# Patient Record
Sex: FEMALE | Race: BLACK OR AFRICAN AMERICAN | NOT HISPANIC OR LATINO | Employment: UNEMPLOYED | URBAN - METROPOLITAN AREA
[De-identification: names, ages, dates, MRNs, and addresses within clinical notes are randomized per-mention and may not be internally consistent; named-entity substitution may affect disease eponyms.]

---

## 2023-08-28 ENCOUNTER — HOSPITAL ENCOUNTER (EMERGENCY)
Facility: HOSPITAL | Age: 58
Discharge: HOME | End: 2023-08-28
Attending: EMERGENCY MEDICINE

## 2023-08-28 ENCOUNTER — APPOINTMENT (EMERGENCY)
Dept: CT IMAGING | Facility: HOSPITAL | Age: 58
End: 2023-08-28

## 2023-08-28 VITALS
DIASTOLIC BLOOD PRESSURE: 86 MMHG | HEIGHT: 66 IN | HEART RATE: 66 BPM | OXYGEN SATURATION: 98 % | RESPIRATION RATE: 17 BRPM | SYSTOLIC BLOOD PRESSURE: 154 MMHG | BODY MASS INDEX: 28.93 KG/M2 | TEMPERATURE: 97.8 F | WEIGHT: 180 LBS

## 2023-08-28 DIAGNOSIS — K21.9 GERD (GASTROESOPHAGEAL REFLUX DISEASE): ICD-10-CM

## 2023-08-28 DIAGNOSIS — R07.89 ATYPICAL CHEST PAIN: Primary | ICD-10-CM

## 2023-08-28 DIAGNOSIS — R91.1 PULMONARY NODULE: ICD-10-CM

## 2023-08-28 LAB
ANION GAP SERPL CALCULATED.3IONS-SCNC: 6 MMOL/L
BASOPHILS # BLD AUTO: 0.02 THOUSANDS/ÂΜL (ref 0–0.1)
BASOPHILS NFR BLD AUTO: 0 % (ref 0–1)
BNP SERPL-MCNC: 60 PG/ML (ref 0–100)
BUN SERPL-MCNC: 12 MG/DL (ref 5–25)
CALCIUM SERPL-MCNC: 9.4 MG/DL (ref 8.4–10.2)
CARDIAC TROPONIN I PNL SERPL HS: 3 NG/L
CHLORIDE SERPL-SCNC: 105 MMOL/L (ref 96–108)
CO2 SERPL-SCNC: 29 MMOL/L (ref 21–32)
CREAT SERPL-MCNC: 0.95 MG/DL (ref 0.6–1.3)
EOSINOPHIL # BLD AUTO: 0.11 THOUSAND/ÂΜL (ref 0–0.61)
EOSINOPHIL NFR BLD AUTO: 2 % (ref 0–6)
ERYTHROCYTE [DISTWIDTH] IN BLOOD BY AUTOMATED COUNT: 12.6 % (ref 11.6–15.1)
GFR SERPL CREATININE-BSD FRML MDRD: 66 ML/MIN/1.73SQ M
GLUCOSE SERPL-MCNC: 93 MG/DL (ref 65–140)
HCT VFR BLD AUTO: 40.6 % (ref 34.8–46.1)
HGB BLD-MCNC: 13.5 G/DL (ref 11.5–15.4)
IMM GRANULOCYTES # BLD AUTO: 0.01 THOUSAND/UL (ref 0–0.2)
IMM GRANULOCYTES NFR BLD AUTO: 0 % (ref 0–2)
LYMPHOCYTES # BLD AUTO: 1.74 THOUSANDS/ÂΜL (ref 0.6–4.47)
LYMPHOCYTES NFR BLD AUTO: 35 % (ref 14–44)
MCH RBC QN AUTO: 29 PG (ref 26.8–34.3)
MCHC RBC AUTO-ENTMCNC: 33.3 G/DL (ref 31.4–37.4)
MCV RBC AUTO: 87 FL (ref 82–98)
MONOCYTES # BLD AUTO: 0.5 THOUSAND/ÂΜL (ref 0.17–1.22)
MONOCYTES NFR BLD AUTO: 10 % (ref 4–12)
NEUTROPHILS # BLD AUTO: 2.62 THOUSANDS/ÂΜL (ref 1.85–7.62)
NEUTS SEG NFR BLD AUTO: 53 % (ref 43–75)
NRBC BLD AUTO-RTO: 0 /100 WBCS
PLATELET # BLD AUTO: 252 THOUSANDS/UL (ref 149–390)
PMV BLD AUTO: 9.4 FL (ref 8.9–12.7)
POTASSIUM SERPL-SCNC: 3.7 MMOL/L (ref 3.5–5.3)
RBC # BLD AUTO: 4.65 MILLION/UL (ref 3.81–5.12)
SODIUM SERPL-SCNC: 140 MMOL/L (ref 135–147)
WBC # BLD AUTO: 5 THOUSAND/UL (ref 4.31–10.16)

## 2023-08-28 PROCEDURE — 80048 BASIC METABOLIC PNL TOTAL CA: CPT | Performed by: PHYSICIAN ASSISTANT

## 2023-08-28 PROCEDURE — 99285 EMERGENCY DEPT VISIT HI MDM: CPT

## 2023-08-28 PROCEDURE — G1004 CDSM NDSC: HCPCS

## 2023-08-28 PROCEDURE — 84484 ASSAY OF TROPONIN QUANT: CPT | Performed by: PHYSICIAN ASSISTANT

## 2023-08-28 PROCEDURE — 36415 COLL VENOUS BLD VENIPUNCTURE: CPT | Performed by: PHYSICIAN ASSISTANT

## 2023-08-28 PROCEDURE — 93005 ELECTROCARDIOGRAM TRACING: CPT

## 2023-08-28 PROCEDURE — 83880 ASSAY OF NATRIURETIC PEPTIDE: CPT | Performed by: PHYSICIAN ASSISTANT

## 2023-08-28 PROCEDURE — 85025 COMPLETE CBC W/AUTO DIFF WBC: CPT | Performed by: PHYSICIAN ASSISTANT

## 2023-08-28 PROCEDURE — 71275 CT ANGIOGRAPHY CHEST: CPT

## 2023-08-28 PROCEDURE — 99285 EMERGENCY DEPT VISIT HI MDM: CPT | Performed by: PHYSICIAN ASSISTANT

## 2023-08-28 RX ORDER — OMEPRAZOLE 20 MG/1
20 CAPSULE, DELAYED RELEASE ORAL DAILY
Qty: 30 CAPSULE | Refills: 0 | Status: SHIPPED | OUTPATIENT
Start: 2023-08-28

## 2023-08-28 RX ADMIN — IOHEXOL 100 ML: 350 INJECTION, SOLUTION INTRAVENOUS at 08:53

## 2023-08-28 NOTE — ED PROVIDER NOTES
History  Chief Complaint   Patient presents with   • Chest Pain     Pt reports that she had chest pain that started Saturday as well as SOB, worsening  last night . 62year old female with past medical history significant for breast cancer s/p radiation and chemotherapy, hypertension presents emergency department chief complaint of right-sided chest pain associate with shortness of breath. Onset of symptoms reported as 2 days ago. Location is reported to right anterior chest but quality is reported as sensation of fullness, worse with leaning forward or leaning to the right side. Quality is reported as type pain occasionally sharp and constant. Associated symptoms: Positive shortness of breath. Denies dizziness or syncope. Denies rash. Denies fevers. Denies wheezing. Modifying factors: Patient reports leaning to the right side worsens symptoms. Patient reports she tried ibuprofen without relief of symptoms. Context: Denies recent fall injury or trauma. Patient is non-smoker. No first-degree family members with premature coronary artery disease reported. History provided by:  Patient   used: No        None       Past Medical History:   Diagnosis Date   • Breast cancer ([address])    • Hypertension        Past Surgical History:   Procedure Laterality Date   • BREAST SURGERY         History reviewed. No pertinent family history. I have reviewed and agree with the history as documented. E-Cigarette/Vaping     E-Cigarette/Vaping Substances          Review of Systems   Constitutional: Negative for fever. Respiratory: Positive for shortness of breath. Negative for chest tightness and stridor. Cardiovascular: Positive for chest pain. Musculoskeletal: Negative for gait problem. Skin: Negative for rash. Neurological: Negative for seizures, syncope, facial asymmetry and numbness. All other systems reviewed and are negative.       Physical Exam  Physical Exam  Vitals and nursing note reviewed. Constitutional:       General: She is not in acute distress. Appearance: Normal appearance. HENT:      Head: Normocephalic and atraumatic. Right Ear: External ear normal.      Left Ear: External ear normal.      Nose: Nose normal.   Eyes:      General: No scleral icterus. Right eye: No discharge. Left eye: No discharge. Cardiovascular:      Rate and Rhythm: Normal rate. Pulses: Normal pulses. Pulmonary:      Effort: Pulmonary effort is normal.      Breath sounds: Normal breath sounds. Musculoskeletal:         General: No tenderness, deformity or signs of injury. Normal range of motion. Cervical back: Normal range of motion and neck supple. Skin:     General: Skin is dry. Coloration: Skin is not jaundiced. Findings: No erythema or rash. Neurological:      General: No focal deficit present. Mental Status: She is alert and oriented to person, place, and time. Mental status is at baseline. Motor: No weakness. Psychiatric:         Mood and Affect: Mood normal.         Behavior: Behavior normal.         Thought Content:  Thought content normal.         Vital Signs  ED Triage Vitals [08/28/23 0740]   Temperature Pulse Respirations Blood Pressure SpO2   97.8 °F (36.6 °C) 71 22 161/90 100 %      Temp Source Heart Rate Source Patient Position - Orthostatic VS BP Location FiO2 (%)   Temporal Monitor Sitting Left arm --      Pain Score       8           Vitals:    08/28/23 0740 08/28/23 0830   BP: 161/90 154/86   Pulse: 71 66   Patient Position - Orthostatic VS: Sitting          Visual Acuity      ED Medications  Medications   iohexol (OMNIPAQUE) 350 MG/ML injection (SINGLE-DOSE) 100 mL (100 mL Intravenous Given 8/28/23 0853)       Diagnostic Studies  Results Reviewed     Procedure Component Value Units Date/Time    B-Type Natriuretic Peptide(BNP) [[id number]]  (Normal) Collected: 08/28/23 0815    Lab Status: Final result Specimen: Blood from Arm, Left Updated: 08/28/23 0850     BNP 60 pg/mL     HS Troponin 0hr (reflex protocol) [[id number]]  (Normal) Collected: 08/28/23 0815    Lab Status: Final result Specimen: Blood from Arm, Left Updated: 08/28/23 0848     hs TnI 0hr 3 ng/L     Basic metabolic panel [[id number]] Collected: 08/28/23 0815    Lab Status: Final result Specimen: Blood from Arm, Left Updated: 08/28/23 0843     Sodium 140 mmol/L      Potassium 3.7 mmol/L      Chloride 105 mmol/L      CO2 29 mmol/L      ANION GAP 6 mmol/L      BUN 12 mg/dL      Creatinine 0.95 mg/dL      Glucose 93 mg/dL      Calcium 9.4 mg/dL      eGFR 66 ml/min/1.73sq m     Narrative:      [place] guidelines for Chronic Kidney Disease (CKD):   •  Stage 1 with normal or high GFR (GFR > 90 mL/min/1.73 square meters)  •  Stage 2 Mild CKD (GFR = 60-89 mL/min/1.73 square meters)  •  Stage 3A Moderate CKD (GFR = 45-59 mL/min/1.73 square meters)  •  Stage 3B Moderate CKD (GFR = 30-44 mL/min/1.73 square meters)  •  Stage 4 Severe CKD (GFR = 15-29 mL/min/1.73 square meters)  •  Stage 5 End Stage CKD (GFR <15 mL/min/1.73 square meters)  Note: GFR calculation is accurate only with a steady state creatinine    CBC and differential [[id number]] Collected: 08/28/23 0815    Lab Status: Final result Specimen: Blood from Arm, Left Updated: 08/28/23 0825     WBC 5.00 Thousand/uL      RBC 4.65 Million/uL      Hemoglobin 13.5 g/dL      Hematocrit 40.6 %      MCV 87 fL      MCH 29.0 pg      MCHC 33.3 g/dL      RDW 12.6 %      MPV 9.4 fL      Platelets 630 Thousands/uL      nRBC 0 /100 WBCs      Neutrophils Relative 53 %      Immat GRANS % 0 %      Lymphocytes Relative 35 %      Monocytes Relative 10 %      Eosinophils Relative 2 %      Basophils Relative 0 %      Neutrophils Absolute 2.62 Thousands/µL      Immature Grans Absolute 0.01 Thousand/uL      Lymphocytes Absolute 1.74 Thousands/µL      Monocytes Absolute 0.50 Thousand/µL      Eosinophils Absolute 0.11 Thousand/µL      Basophils Absolute 0.02 Thousands/µL                  CTA ED chest PE study   Final Result by Jorge A Lisa MD (08/28 1023)   Addendum (preliminary) 1 of 1 by Jorge A Lisa MD (08/28 1023)   ADDENDUM:      Of note, some esophageal thickening is present which is mild with a small    air-fluid level and should be correlated with any history of dysphagia. Small hiatus hernia may also be present as well as probable cysts in the    left kidney also with    low-attenuation. .      Final      No evidence of pulmonary embolus. Bronchial wall thickening suggests bronchitis. There is evidence of right middle lobe atelectasis which appears chronic with mild bronchiectasis but no proximal bronchial lesion. There is a pleural-based nodule in the left upper lobe which could represent a neoplasm. An atypical infection is a possibility. Follow-up in 4 weeks time could be utilized if there is a high index of suspicion for infection, alternatively PET study or    histologic sampling could be considered in this patient with history of breast cancer. Atelectatic changes are also seen at the bases. Smaller nodule abuts the major fissure in the right upper lobe. Right thyroid nodule      Nonemergent right thyroid ultrasound is advised. This was discussed with CARMINE Dawson 10:10 a.m.       Workstation performed: [id number]                    Procedures  ECG 12 Lead Documentation Only    Date/Time: 8/28/2023 7:39 AM    Performed by: Janusz Gold PA-C  Authorized by: Janusz Gold PA-C    Indications / Diagnosis:  C/p  ECG reviewed by me, the ED Provider: yes    Patient location:  ED  Previous ECG:     Previous ECG:  Unavailable    Comparison to cardiac monitor: Yes    Interpretation:     Interpretation: normal    Rate:     ECG rate:  60    ECG rate assessment: normal    Rhythm:     Rhythm: sinus rhythm    Ectopy:     Ectopy: none    QRS:     QRS axis:  Left QRS intervals:  Normal  Conduction:     Conduction: normal    ST segments:     ST segments:  Normal  T waves:     T waves: normal               ED Course  ED Course as of 08/31/23 1918   Mon Aug 28, 2023   0854 BNP normal at 60.  0-hour troponin of 3 is normal.  CBC demonstrates no leukocytosis or anemia, BMP demonstrates no renal failure or hypokalemia. HEART Risk Score    Flowsheet Row Most Recent Value   Heart Score Risk Calculator    History 0 Filed at: 08/31/2023 1914   ECG 0 Filed at: 08/31/2023 1914   Age 1 Filed at: 08/31/2023 1914   Risk Factors 1 Filed at: 08/31/2023 1914   Troponin 0 Filed at: 08/31/2023 1914   HEART Score 2 Filed at: 08/31/2023 1914                        SBIRT 22yo+    Flowsheet Row Most Recent Value   Initial Alcohol Screen: US AUDIT-C     1. How often do you have a drink containing alcohol? 0 Filed at: 08/28/2023 0740   Audit-C Score 0 Filed at: 08/28/2023 0740   MARY LOU: How many times in the past year have you. .. Used an illegal drug or used a prescription medication for non-medical reasons? Never Filed at: 08/28/2023 0740          Wells' Criteria for PE    Flowsheet Row Most Recent Value   Wells' Criteria for PE    Clinical signs and symptoms of DVT 0 Filed at: 08/31/2023 6771   PE is primary diagnosis or equally likely 3 Filed at: 08/31/2023 1915   HR >100 0 Filed at: 08/31/2023 1915   Immobilization at least 3 days or Surgery in the previous 4 weeks 0 Filed at: 08/31/2023 1915   Previous, objectively diagnosed PE or DVT 0 Filed at: 08/31/2023 1915   Hemoptysis 0 Filed at: 08/31/2023 1915   Malignancy with treatment within 6 months or palliative 0 Filed at: 08/31/2023 0485   Wells' Criteria Total 3 Filed at: 08/31/2023 1915                Medical Decision Making  ED Coarse: 62year old female with history of breast cancer s/p chemotherapy, radiation and mastectomy presents with sharp right upper chest pain that started 2 nights about and associated with mild SOB. Patient scheduled to leave on vacation for a cruise and family concerned about her symptoms prompted her to come to ED for evaluation. Has history as acid reflux, in the past.     DDX:  Differential diagnosis:  Chest pain: Consider life-threatening diagnosis including ACS, aortic dissection, tamponade, Boerhaave syndrome, pulmonary embolism or tension pneumothorax. Also consider pneumonia, pleurisy, pulmonary fibrosis, pleural effusion,  costochondritis,Tietze's syndrome, rib fracture, esophageal spasm, and anxiety    Initial ED Plan: EKG and troponin for ACS evaluation. CBC for anemia, leukocytosis, BMP For renal function, electrolytes. CTA chest to evaluation for pl. Effusion, pneumonia, PE or pulmonary fibrosis, increased risk due to history breast cancer. Cardiac monitor for arrhythmias. The cardiac monitor revealed sinus rhythm as interpreted by me. The cardiac monitor was ordered secondary to history of chest pain and to monitor patient for potential dysrhythmia. My initial ekg interpretation. Normal sinus rhythm. No STEMI.      MDM:  I have reviewed the patient's vital signs, nursing notes, and other relevant ancillary testing/information. I have had a detailed discussion with the patient regarding the historical points, examination findings, and any diagnostic results    Results:  Reviewed at bedside: initial troponin 3, symptom onset 2 days ago, EKG normal. ACS workup negative. HEART score 2 - low risk for MACE within 6 weeks,  No indication for delta troponin or additional workup. BNP 60 negative for heart failure. No anemia or renal failure on exam.  CTA performed due to history breast cancer s/p chemo/radiation treatment causing increased risk for PE or effusion was negative for PE. There was a pleural  based nodule to Left upper lobe and thyroid nodules which patient was informed about in ED.   Inconsistent with right sided chest pain but was instructed to follow up with established oncologists for further evaluation for further malignancy surveillance. Bronchial wall thickening suggesting bronchitis likely secondary to prior radiation treatment but patient with no wheezing, cough or congestion. Esophageal thickening noted on CT is consistent with patient's prior history of GERD in patient who has been without treatment for awhile. Discussed with patient this is likely source of her chest pain symptoms. Will start omeprazole, discussed gastritis/ulcer diet and foods to avoid and recommended followup with PCP and established specialists as outpatient for further evaluation of symptoms. Patient was happy with her test results and reassured, requesting discharge home. ED Final Assessment 1) atypical chest pain 2) pulmonary nodule  3) GERD    I discussed diagnosis and treatment plan with patient at bedside. Extended discussion with patient regarding the diagnosis, pathophysiology, expectant coarse and treatment plan. Instructed to follow up with pcp and recommended specialist in 3-5 days. Reviewed reasons to return to ed. Patient verbalized understanding of diagnosis and agreement with discharge plan of care as well as understanding of reasons to return to ed. Atypical chest pain: undiagnosed new problem with uncertain prognosis  Amount and/or Complexity of Data Reviewed  Labs: ordered. Radiology: ordered. Risk  Prescription drug management.           Disposition  Final diagnoses:   Atypical chest pain   Pulmonary nodule   GERD (gastroesophageal reflux disease)     Time reflects when diagnosis was documented in both MDM as applicable and the Disposition within this note     Time User Action Codes Description Comment    8/28/2023 10:21 AM Spencer Chimera Add [R07.89] Atypical chest pain     8/28/2023 10:21 AM Marietta Chimera Add [R91.1] Pulmonary nodule     8/28/2023 10:21 AM Marietta Chimera Add [K21.9] GERD (gastroesophageal reflux disease)       ED Disposition     ED Disposition   Discharge    Condition   Stable    Date/Time   Mon Aug 28, 2023 10:21 AM    Comment   Jessica Bahena discharge to home/self care. Follow-up Information     Follow up With Specialties Details Why Contact Info Additional [facility] Emergency Department Emergency Medicine Go to  If symptoms worsen [address] [facility] Emergency Department, [place]yn [place], [address], [address]    Primary Care Physician and Oncologist at Home  Call in 2 weeks for further evaluation of symptoms            Discharge Medication List as of 8/28/2023 10:23 AM      START taking these medications    Details   omeprazole (PriLOSEC) 20 mg delayed release capsule Take 1 capsule (20 mg total) by mouth daily, Starting Mon 8/28/2023, Normal             No discharge procedures on file.     PDMP Review     None          ED Provider  Electronically Signed by           Dmitry Yo PA-C  08/31/23 5947

## 2023-08-29 LAB
ATRIAL RATE: 60 BPM
P AXIS: 55 DEGREES
PR INTERVAL: 158 MS
QRS AXIS: -42 DEGREES
QRSD INTERVAL: 92 MS
QT INTERVAL: 420 MS
QTC INTERVAL: 420 MS
T WAVE AXIS: 16 DEGREES
VENTRICULAR RATE: 60 BPM

## 2023-08-29 PROCEDURE — 93010 ELECTROCARDIOGRAM REPORT: CPT | Performed by: INTERNAL MEDICINE
